# Patient Record
Sex: FEMALE | Race: ASIAN | Employment: UNEMPLOYED | URBAN - METROPOLITAN AREA
[De-identification: names, ages, dates, MRNs, and addresses within clinical notes are randomized per-mention and may not be internally consistent; named-entity substitution may affect disease eponyms.]

---
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
## 2022-05-20 ENCOUNTER — OFFICE VISIT (OUTPATIENT)
Dept: FAMILY MEDICINE | Facility: CLINIC | Age: 22
End: 2022-05-20
Payer: COMMERCIAL

## 2022-05-20 VITALS
DIASTOLIC BLOOD PRESSURE: 86 MMHG | RESPIRATION RATE: 16 BRPM | HEART RATE: 74 BPM | OXYGEN SATURATION: 99 % | SYSTOLIC BLOOD PRESSURE: 125 MMHG

## 2022-05-20 DIAGNOSIS — H69.92 DYSFUNCTION OF LEFT EUSTACHIAN TUBE: Primary | ICD-10-CM

## 2022-05-20 PROCEDURE — 99213 OFFICE O/P EST LOW 20 MIN: CPT | Mod: GC | Performed by: STUDENT IN AN ORGANIZED HEALTH CARE EDUCATION/TRAINING PROGRAM

## 2022-05-20 RX ORDER — FLUTICASONE PROPIONATE 50 MCG
2 SPRAY, SUSPENSION (ML) NASAL DAILY
Qty: 18.2 ML | Refills: 0 | Status: SHIPPED | OUTPATIENT
Start: 2022-05-20

## 2022-05-20 NOTE — PROGRESS NOTES
Preceptor Attestation:   Patient seen, evaluated and discussed with the resident. I have verified the content of the note, which accurately reflects my assessment of the patient and the plan of care.    Supervising Physician:Hilaria Zhang MD    [facility]

## 2022-05-20 NOTE — PROGRESS NOTES
Assessment & Plan     Dysfunction of left eustachian tube  Comment: Her symptoms of muffled hearing in the context of dust exposure, sore throat, and no constitutional symptoms are consistent with eustachian tube dysfunction. An intranasal corticosteroid should help ameliorate the inflammation in the eustachian tubes and help improve her symptom of muffled hearing.  - fluticasone (FLONASE) 50 MCG/ACT nasal spray; Spray 2 sprays into both nostrils daily  - Return in 2 weeks if not improved               No follow-ups on file.    Carroll Montiel, MS4    I was present with the medical student who participated in the service and in the documentation of this note. I have verified the history and personally performed the physical exam and medical decision making, and have verified the content of the note, which accurately reflects my assessment of the patient and the plan of care.     Chao Escobedo DO  [facility]    Precepted with Dr. Hilaria Zhang MD    Subjective   Etienne Lowe is a 22 year old who presents for the following health issues.    HPI     Has had ear muffling and pain for about 3-4 days. Was cleaning and believes she inhaled some dust last Friday which was very irritating to her throat. No known history of ear infections. No recent illnesses. No fevers or chills. Does not have known history of allergies. The left ear is muffled but the right ear is unaffected. Has not noticed any drainage from her ear. Had been using an ear pick to clean her ears. Had sore throat which started last Saturday. She took nyquill, dayquill, and advil, which helped relieve her symptoms. Is still coughing up white/green mucus. The mucus has caused her to wake up from sleep at night and makes it difficult to breathe through her nose at night, as she normally breathes through her nose while sleeping.         Objective    /86   Pulse 74   Resp 16   SpO2 99%   There is no height or weight on file  to calculate BMI.  Physical Exam   GENERAL: healthy, alert and no distress  EYES: Eyes grossly normal to inspection, PERRL and conjunctivae and sclerae normal  HENT: right ear canal and TM is normal, left ear canal is somewhat erythematous and the TM is poorly visualized, no pinna or mastoid erythema, nose and mouth without ulcers or lesions; guzman test localizes to right (unnafected ear), rinne test is AC>BC bilaterally; muffled hearing on left ear  NECK: no adenopathy, no asymmetry, masses, or scars and thyroid normal to palpation  RESP: lungs clear to auscultation - no rales, rhonchi or wheezes  CV: regular rate and rhythm, normal S1 S2, no S3 or S4, no murmur, click or rub, no peripheral edema and peripheral pulses strong  MS: no gross musculoskeletal defects noted, no edema  SKIN: no suspicious lesions or rashes  NEURO: mentation intact and speech normal  PSYCH: mentation appears normal, affect normal/bright    ----- Services Performed by a MEDICAL STUDENT in Presence of RESIDENT/FELLOW Physician-------

## 2023-06-15 ENCOUNTER — TELEPHONE (OUTPATIENT)
Dept: FAMILY MEDICINE | Facility: CLINIC | Age: 23
End: 2023-06-15